# Patient Record
Sex: MALE | Race: WHITE | HISPANIC OR LATINO
[De-identification: names, ages, dates, MRNs, and addresses within clinical notes are randomized per-mention and may not be internally consistent; named-entity substitution may affect disease eponyms.]

---

## 2017-04-24 ENCOUNTER — APPOINTMENT (OUTPATIENT)
Dept: INTERNAL MEDICINE | Facility: CLINIC | Age: 52
End: 2017-04-24

## 2017-04-26 ENCOUNTER — APPOINTMENT (OUTPATIENT)
Dept: INTERNAL MEDICINE | Facility: CLINIC | Age: 52
End: 2017-04-26

## 2017-04-26 VITALS
DIASTOLIC BLOOD PRESSURE: 70 MMHG | WEIGHT: 169 LBS | SYSTOLIC BLOOD PRESSURE: 115 MMHG | HEART RATE: 73 BPM | BODY MASS INDEX: 28.16 KG/M2 | TEMPERATURE: 98.2 F | HEIGHT: 65 IN

## 2017-04-26 DIAGNOSIS — Z78.9 OTHER SPECIFIED HEALTH STATUS: ICD-10-CM

## 2017-04-26 DIAGNOSIS — Z00.00 ENCOUNTER FOR GENERAL ADULT MEDICAL EXAMINATION W/OUT ABNORMAL FINDINGS: ICD-10-CM

## 2017-04-26 DIAGNOSIS — Z87.442 PERSONAL HISTORY OF URINARY CALCULI: ICD-10-CM

## 2017-04-26 DIAGNOSIS — I25.10 ATHEROSCLEROTIC HEART DISEASE OF NATIVE CORONARY ARTERY W/OUT ANGINA PECTORIS: ICD-10-CM

## 2017-04-26 DIAGNOSIS — Z95.5 PRESENCE OF CORONARY ANGIOPLASTY IMPLANT AND GRAFT: ICD-10-CM

## 2017-04-26 DIAGNOSIS — I10 ESSENTIAL (PRIMARY) HYPERTENSION: ICD-10-CM

## 2017-04-26 DIAGNOSIS — J98.4 OTHER DISORDERS OF LUNG: ICD-10-CM

## 2017-04-26 DIAGNOSIS — E11.9 TYPE 2 DIABETES MELLITUS W/OUT COMPLICATIONS: ICD-10-CM

## 2017-04-26 RX ORDER — AMLODIPINE BESYLATE 5 MG/1
5 TABLET ORAL
Refills: 0 | Status: ACTIVE | COMMUNITY

## 2017-04-26 RX ORDER — LISINOPRIL 40 MG/1
40 TABLET ORAL
Refills: 0 | Status: ACTIVE | COMMUNITY

## 2017-04-26 RX ORDER — ASPIRIN 81 MG
81 TABLET, DELAYED RELEASE (ENTERIC COATED) ORAL
Refills: 0 | Status: ACTIVE | COMMUNITY

## 2017-04-26 RX ORDER — ROSUVASTATIN CALCIUM 40 MG/1
40 TABLET, FILM COATED ORAL
Refills: 0 | Status: ACTIVE | COMMUNITY

## 2017-04-26 RX ORDER — PSYLLIUM HUSK 0.4 G
CAPSULE ORAL
Refills: 0 | Status: ACTIVE | COMMUNITY

## 2017-04-26 RX ORDER — MULTIVITAMIN
CAPSULE ORAL
Refills: 0 | Status: ACTIVE | COMMUNITY

## 2017-04-27 ENCOUNTER — TRANSCRIPTION ENCOUNTER (OUTPATIENT)
Age: 52
End: 2017-04-27

## 2017-05-12 ENCOUNTER — APPOINTMENT (OUTPATIENT)
Dept: GASTROENTEROLOGY | Facility: CLINIC | Age: 52
End: 2017-05-12

## 2017-10-26 ENCOUNTER — APPOINTMENT (OUTPATIENT)
Dept: INTERNAL MEDICINE | Facility: CLINIC | Age: 52
End: 2017-10-26

## 2019-04-19 ENCOUNTER — APPOINTMENT (OUTPATIENT)
Dept: SLEEP CENTER | Facility: CLINIC | Age: 54
End: 2019-04-19

## 2019-04-19 ENCOUNTER — TRANSCRIPTION ENCOUNTER (OUTPATIENT)
Age: 54
End: 2019-04-19

## 2019-06-01 ENCOUNTER — APPOINTMENT (OUTPATIENT)
Dept: SLEEP CENTER | Facility: CLINIC | Age: 54
End: 2019-06-01
Payer: COMMERCIAL

## 2019-06-01 ENCOUNTER — OUTPATIENT (OUTPATIENT)
Dept: OUTPATIENT SERVICES | Facility: HOSPITAL | Age: 54
LOS: 1 days | End: 2019-06-01
Payer: COMMERCIAL

## 2019-06-01 PROCEDURE — 95811 POLYSOM 6/>YRS CPAP 4/> PARM: CPT

## 2019-06-01 PROCEDURE — 95811 POLYSOM 6/>YRS CPAP 4/> PARM: CPT | Mod: 26

## 2019-06-03 DIAGNOSIS — G47.33 OBSTRUCTIVE SLEEP APNEA (ADULT) (PEDIATRIC): ICD-10-CM

## 2019-07-15 ENCOUNTER — APPOINTMENT (OUTPATIENT)
Dept: INTERNAL MEDICINE | Facility: CLINIC | Age: 54
End: 2019-07-15

## 2019-07-19 ENCOUNTER — TRANSCRIPTION ENCOUNTER (OUTPATIENT)
Age: 54
End: 2019-07-19

## 2019-08-05 ENCOUNTER — MOBILE ON CALL (OUTPATIENT)
Age: 54
End: 2019-08-05

## 2019-09-19 ENCOUNTER — APPOINTMENT (OUTPATIENT)
Dept: PULMONOLOGY | Facility: CLINIC | Age: 54
End: 2019-09-19
Payer: COMMERCIAL

## 2019-09-19 VITALS
RESPIRATION RATE: 18 BRPM | WEIGHT: 170 LBS | HEART RATE: 78 BPM | DIASTOLIC BLOOD PRESSURE: 81 MMHG | OXYGEN SATURATION: 96 % | TEMPERATURE: 97.9 F | BODY MASS INDEX: 28.67 KG/M2 | SYSTOLIC BLOOD PRESSURE: 120 MMHG | HEIGHT: 64.47 IN

## 2019-09-19 VITALS — HEIGHT: 64.57 IN | WEIGHT: 170 LBS | BODY MASS INDEX: 28.67 KG/M2

## 2019-09-19 DIAGNOSIS — G47.33 OBSTRUCTIVE SLEEP APNEA (ADULT) (PEDIATRIC): ICD-10-CM

## 2019-09-19 DIAGNOSIS — J45.20 MILD INTERMITTENT ASTHMA, UNCOMPLICATED: ICD-10-CM

## 2019-09-19 PROCEDURE — 94060 EVALUATION OF WHEEZING: CPT

## 2019-09-19 PROCEDURE — 94726 PLETHYSMOGRAPHY LUNG VOLUMES: CPT

## 2019-09-19 PROCEDURE — 94729 DIFFUSING CAPACITY: CPT

## 2019-09-19 PROCEDURE — ZZZZZ: CPT

## 2019-09-19 PROCEDURE — 99203 OFFICE O/P NEW LOW 30 MIN: CPT | Mod: 25

## 2019-09-19 RX ORDER — METFORMIN HYDROCHLORIDE 1000 MG/1
1000 TABLET, COATED ORAL
Refills: 0 | Status: DISCONTINUED | COMMUNITY
End: 2019-09-19

## 2019-09-19 RX ORDER — CLOPIDOGREL 75 MG/1
TABLET, FILM COATED ORAL
Refills: 0 | Status: DISCONTINUED | COMMUNITY
End: 2019-09-19

## 2019-09-19 RX ORDER — ATENOLOL 50 MG/1
50 TABLET ORAL
Refills: 0 | Status: DISCONTINUED | COMMUNITY
End: 2019-09-19

## 2019-09-19 RX ORDER — SEMAGLUTIDE 1.34 MG/ML
2 INJECTION, SOLUTION SUBCUTANEOUS
Refills: 0 | Status: ACTIVE | COMMUNITY
Start: 2019-09-19

## 2019-09-19 RX ORDER — CANAGLIFLOZIN 300 MG/1
300 TABLET, FILM COATED ORAL
Refills: 0 | Status: DISCONTINUED | COMMUNITY
End: 2019-09-19

## 2019-09-19 RX ORDER — LATANOPROST/PF 0.005 %
0.01 DROPS OPHTHALMIC (EYE)
Refills: 0 | Status: DISCONTINUED | COMMUNITY
End: 2019-09-19

## 2019-09-19 RX ORDER — METOPROLOL SUCCINATE 50 MG/1
50 TABLET, EXTENDED RELEASE ORAL
Refills: 0 | Status: ACTIVE | COMMUNITY
Start: 2019-09-19

## 2019-09-19 RX ORDER — ALBUTEROL SULFATE 90 UG/1
108 (90 BASE) AEROSOL, METERED RESPIRATORY (INHALATION)
Refills: 0 | Status: ACTIVE | COMMUNITY
Start: 2019-09-19

## 2019-09-20 PROBLEM — J45.20 ASTHMA, MILD INTERMITTENT: Status: ACTIVE | Noted: 2017-04-26

## 2019-09-20 NOTE — ASSESSMENT
[FreeTextEntry1] : ATTENDING ATTESTATION\par \par 54 year old male here for SAMY and Asthma visit. Pt Lino has pmhx of HTN, HLD, CAD s/p stent, Diabetes and obstructive sleep apnea. Dxd with SAMY and asthma many years ago Has CPAP device at home unsure of DME company, [facility] provides supplies. Here today to review sleep titration test report. No acute complaints. Asthma well controlled.\par \par SAMY\par - get nasal pillows mask \par - discussed importance of compliance with CPAP machine. [facility] clinic will order CPAP device. \par - reviewed sleep test results \par - discussed use of melatonin if having sleep difficulties and to discuss first with cardiologist prior to use\par \par Asthma \par - cw Ventolin inhaler PRN \par - PFT wnl performed today Fev1 104, FVC 95, DLCO 82%\par \par f/u as needed

## 2019-09-20 NOTE — ASSESSMENT
[FreeTextEntry1] : ATTENDING ATTESTATION\par \par 54 year old male here for SAMY and Asthma visit. Pt Lino has pmhx of HTN, HLD, CAD s/p stent, Diabetes and obstructive sleep apnea. Dxd with SAMY and asthma many years ago Has CPAP device at home unsure of DME company, [facility] provides supplies. Here today to review sleep titration test report. No acute complaints. Asthma well controlled.\par \par SAMY\par - get nasal pillows mask \par - discussed importance of compliance with CPAP machine. [facility] clinic will order CPAP device. \par - reviewed sleep test results \par - discussed use of melatonin if having sleep difficulties and to discuss first with cardiologist prior to use\par \par Asthma \par - cw Ventolin inhaler PRN \par - PFT wnl performed today Fev1 104, FVC 95, DLCO 82%\par \par f/u as needed

## 2019-09-20 NOTE — REVIEW OF SYSTEMS
[Hypertension] : ~T hypertension [Nocturia] : nocturia [Diabetes] : diabetes mellitus [Difficulty Maintaining Sleep] : difficulty maintaining sleep [Snoring] : snoring [Negative] : HEENT [Difficulty Initiating Sleep] : no difficulty falling asleep [Dysesthesias] : no dysesthesias [Unusual Sleep Behavior] : no unusual sleep behavior [Paresthesias] : no paresthesias [Unusual Movements] : no involuntary movements during sleep [Witnessed Apneas] : demonstrated no ~M apnea [Nonrestorative Sleep] : restorative sleep [Awakes With Headache] : awakes without a headache [Cataplexy] :  no cataplexy [Awakes With Dry Mouth] : awakes without dry mouth [Hypersomnolence] : not sleeping much more than usual [Sleep Paralysis] : no sleep paralysis [Hypnogogic Hallucinations] : no hypnogogic hallucinations [Hypnopompic Hallucinations] : no hypnopompic hallucinations [FreeTextEntry9] : CAD s/p stent

## 2019-09-20 NOTE — PHYSICAL EXAM
[General Appearance - Well Developed] : well developed [General Appearance - Well Nourished] : well nourished [Normal Conjunctiva] : the conjunctiva exhibited no abnormalities [Normal Oropharynx] : normal oropharynx [Enlarged Base of the Tongue] : enlargement of the base of the tongue [III] : III [Neck Appearance] : the appearance of the neck was normal [Neck Cervical Mass (___cm)] : no neck mass was observed [Heart Rate And Rhythm] : heart rate and rhythm were normal [Heart Sounds] : normal S1 and S2 [Murmurs] : no murmurs present [] : no respiratory distress [Respiration, Rhythm And Depth] : normal respiratory rhythm and effort [Exaggerated Use Of Accessory Muscles For Inspiration] : no accessory muscle use [Auscultation Breath Sounds / Voice Sounds] : lungs were clear to auscultation bilaterally [No Focal Deficits] : no focal deficits [Oriented To Time, Place, And Person] : oriented to person, place, and time [Affect] : the affect was normal [Mood] : the mood was normal [Low Lying Soft Palate] : no low lying soft palate [Elongated Uvula] : no elongated uvula [Erythema] : no erythema of the pharynx

## 2019-09-20 NOTE — REVIEW OF SYSTEMS
[Hypertension] : ~T hypertension [Nocturia] : nocturia [Diabetes] : diabetes mellitus [Difficulty Maintaining Sleep] : difficulty maintaining sleep [Snoring] : snoring [Negative] : HEENT [Dysesthesias] : no dysesthesias [Difficulty Initiating Sleep] : no difficulty falling asleep [Unusual Sleep Behavior] : no unusual sleep behavior [Paresthesias] : no paresthesias [Nonrestorative Sleep] : restorative sleep [Witnessed Apneas] : demonstrated no ~M apnea [Unusual Movements] : no involuntary movements during sleep [Awakes With Headache] : awakes without a headache [Awakes With Dry Mouth] : awakes without dry mouth [Cataplexy] :  no cataplexy [Hypersomnolence] : not sleeping much more than usual [Sleep Paralysis] : no sleep paralysis [Hypnogogic Hallucinations] : no hypnogogic hallucinations [Hypnopompic Hallucinations] : no hypnopompic hallucinations [FreeTextEntry9] : CAD s/p stent

## 2019-09-20 NOTE — HISTORY OF PRESENT ILLNESS
[Obstructive Sleep Apnea] : obstructive sleep apnea [Snoring] : snoring [Unintentional Sleep While Inactive] : unintentional sleep while inactive [Awakes Unrefreshed] : awakening unrefreshed [Awakening With Dry Mouth] : awakening with dry mouth [DMS] : DMS [To Bed: ___] : ~he/she~ goes to bed at [unfilled] [Arises: ___] : arises at [unfilled] [Sleep Onset Latency: ___ minutes] : sleep onset latency of [unfilled] minutes reported [Nocturnal Awakenings: ___] : ~he/she~ typically has [unfilled] nocturnal awakenings [TST: ___] : Total sleep time is [unfilled] [Daytime Sleep: ___] : daytime sleep: [unfilled] [CPAP: ___ cmH2O] : CPAP: [unfilled] cmH2O [Stable] : are stable [Difficulty Breathing During Exertion] : stable dyspnea on exertion [Feelings Of Weakness On Exertion] : denies exercise intolerance [Cough] : denies coughing [Wheezing] : denies wheezing [Regional Soft Tissue Swelling Both Lower Extremities] : denies lower extremity edema [Chest Pain Or Discomfort] : denies chest pain [Fever] : denies fever [0.5  -  Very, very slight] : 0.5, very, very slight [Date: ___] : was performed [unfilled] [Cold] : cold weather [Wt Gain ___ Lbs] : no recent weight gain [Wt Loss ___ Lbs] : no recent weight loss [FreeTextEntry9] : with his prior pulmonologist and [facility] center [Oxygen] : the patient uses no supplemental oxygen [de-identified] : Last CXR reported 12/2018 with [facility] [de-identified] : 9/19/19 [Witnessed Apneas] : no witnessed sleep apnea [Frequent Nocturnal Awakening] : no nocturnal awakening [Daytime Somnolence] : no daytime somnolence [Unintentional Sleep while Active] : no unintentional sleep while active [Awakes with Headache] : no headache upon awakening [Recent  Weight Gain] : no recent weight gain [DIS] : no DIS [Hypersomnolence] : no hypersomnolence [Unusual Sleep Behavior] : no unusual sleep behavior [Cataplexy] : no cataplexy [Hypnagogic Hallucinations] : no hallucinations when falling asleep [Sleep Paralysis] : no sleep paralysis [Hypnopompic Hallucinations] : no hallucinations when awakening [Lower Extremity Discomfort] : no lower extremity discomfort in evening or at bedtime [FreeTextEntry1] : 54 year old male with pmhx of HTN, HLD, CAD s/p stent, Diabetes and asthma, and obstructive sleep apnea here for intial visit. Has hx of [facility] exposure as [employer]. SAMY was diagnosed about 10 years and had sleep test at lab years ago with Dr Jose oliveira Pulmonologist. Noncompliant with CPAP machine due to size of mask never used machine nightly. Pt had recent in lab titration and would like to review the results. Says he will use his CPAP if he has a more conformable and well fitting mask like the one they used at the titration. Was using full face prior. [facility] orders all supplies for CPAP machine. Asthma is well controlled, diagnosed several years ago after visiting [facility] clinic. Last CXR 12/2018 pt reports they were clear. Has ventolin inhaler uses infrequently maybe once in the winter time. + chronic mild DESIR, sometimes has trouble falling back to sleep once he wakes up in the middles of the night\par Denies sob, wheezes, cp, palps, cough, daytime sleepiness, drowsy driving, DIS\par \par also inquiring about Melatonin use for "deeper sleep"\par \par Does not use CPAP last use 7-8 months ago\par Titration test optimal setting 6cmH2O, O2 imporved with CPAP\par Mask F&P FFM small \par \par PFT done today FEV1 104%, FVC 95%, DLCO 82%\par

## 2019-09-20 NOTE — HISTORY OF PRESENT ILLNESS
[Obstructive Sleep Apnea] : obstructive sleep apnea [Snoring] : snoring [Unintentional Sleep While Inactive] : unintentional sleep while inactive [Awakes Unrefreshed] : awakening unrefreshed [Awakening With Dry Mouth] : awakening with dry mouth [DMS] : DMS [To Bed: ___] : ~he/she~ goes to bed at [unfilled] [Arises: ___] : arises at [unfilled] [Sleep Onset Latency: ___ minutes] : sleep onset latency of [unfilled] minutes reported [Nocturnal Awakenings: ___] : ~he/she~ typically has [unfilled] nocturnal awakenings [TST: ___] : Total sleep time is [unfilled] [Daytime Sleep: ___] : daytime sleep: [unfilled] [CPAP: ___ cmH2O] : CPAP: [unfilled] cmH2O [Stable] : are stable [Difficulty Breathing During Exertion] : stable dyspnea on exertion [Feelings Of Weakness On Exertion] : denies exercise intolerance [Cough] : denies coughing [Wheezing] : denies wheezing [Regional Soft Tissue Swelling Both Lower Extremities] : denies lower extremity edema [Chest Pain Or Discomfort] : denies chest pain [Fever] : denies fever [0.5  -  Very, very slight] : 0.5, very, very slight [Date: ___] : was performed [unfilled] [Cold] : cold weather [Wt Gain ___ Lbs] : no recent weight gain [Wt Loss ___ Lbs] : no recent weight loss [Oxygen] : the patient uses no supplemental oxygen [FreeTextEntry9] : with his prior pulmonologist and [facility] center [de-identified] : Last CXR reported 12/2018 with [facility] [de-identified] : 9/19/19 [Witnessed Apneas] : no witnessed sleep apnea [Frequent Nocturnal Awakening] : no nocturnal awakening [Daytime Somnolence] : no daytime somnolence [Awakes with Headache] : no headache upon awakening [Unintentional Sleep while Active] : no unintentional sleep while active [Recent  Weight Gain] : no recent weight gain [DIS] : no DIS [Cataplexy] : no cataplexy [Hypersomnolence] : no hypersomnolence [Unusual Sleep Behavior] : no unusual sleep behavior [Sleep Paralysis] : no sleep paralysis [Hypnagogic Hallucinations] : no hallucinations when falling asleep [Hypnopompic Hallucinations] : no hallucinations when awakening [Lower Extremity Discomfort] : no lower extremity discomfort in evening or at bedtime [FreeTextEntry1] : 54 year old male with pmhx of HTN, HLD, CAD s/p stent, Diabetes and asthma, and obstructive sleep apnea here for intial visit. Has hx of [facility] exposure as [employer]. SAMY was diagnosed about 10 years and had sleep test at lab years ago with Dr Jose oliveira Pulmonologist. Noncompliant with CPAP machine due to size of mask never used machine nightly. Pt had recent in lab titration and would like to review the results. Says he will use his CPAP if he has a more conformable and well fitting mask like the one they used at the titration. Was using full face prior. [facility] orders all supplies for CPAP machine. Asthma is well controlled, diagnosed several years ago after visiting [facility] clinic. Last CXR 12/2018 pt reports they were clear. Has ventolin inhaler uses infrequently maybe once in the winter time. + chronic mild DESIR, sometimes has trouble falling back to sleep once he wakes up in the middles of the night\par Denies sob, wheezes, cp, palps, cough, daytime sleepiness, drowsy driving, DIS\par \par also inquiring about Melatonin use for "deeper sleep"\par \par Does not use CPAP last use 7-8 months ago\par Titration test optimal setting 6cmH2O, O2 imporved with CPAP\par Mask F&P FFM small \par \par PFT done today FEV1 104%, FVC 95%, DLCO 82%\par

## 2019-09-20 NOTE — END OF VISIT
[FreeTextEntry3] : I agree with the physician assistant's history, physical examination and plan of care. I personally elicited a history and examined the patient. See above attestation.\par \par  [>50% of Time Spent on Counseling and Coordination of Care for  ___] : Greater than 50% of the encounter time was spent on counseling and coordination of care for [unfilled] [Time Spent: ___ minutes] : I have spent [unfilled] minutes of face to face time with the patient